# Patient Record
Sex: MALE | Race: WHITE | Employment: FULL TIME | URBAN - METROPOLITAN AREA
[De-identification: names, ages, dates, MRNs, and addresses within clinical notes are randomized per-mention and may not be internally consistent; named-entity substitution may affect disease eponyms.]

---

## 2017-11-07 ENCOUNTER — OFFICE VISIT (OUTPATIENT)
Dept: ENDOCRINOLOGY | Age: 57
End: 2017-11-07

## 2017-11-07 VITALS
WEIGHT: 196.5 LBS | DIASTOLIC BLOOD PRESSURE: 89 MMHG | SYSTOLIC BLOOD PRESSURE: 134 MMHG | BODY MASS INDEX: 26.04 KG/M2 | HEART RATE: 76 BPM | HEIGHT: 73 IN

## 2017-11-07 DIAGNOSIS — E03.9 ACQUIRED HYPOTHYROIDISM: ICD-10-CM

## 2017-11-07 DIAGNOSIS — E04.1 THYROID CYST: Primary | ICD-10-CM

## 2017-11-07 NOTE — MR AVS SNAPSHOT
Visit Information Date & Time Provider Department Dept. Phone Encounter #  
 11/7/2017  1:30 PM Monique Gorman, [address] [place] [address] Diabetes and Endocrinology [phone] [id number] Follow-up Instructions Return in about 6 months (around 4/30/2018). Upcoming Health Maintenance Date Due Hepatitis C Screening 1960 DTaP/Tdap/Td series (1 - Tdap) 2/14/1979 FOBT Q 1 YEAR AGE 50-75 2/14/2008 Influenza Age 5 to Adult 8/1/2017 Allergies as of 11/7/2017  Review Complete On: 11/7/2017 By: Monique Gorman MD  
  
 Severity Noted Reaction Type Reactions Sulfa (Sulfonamide Antibiotics)  11/07/2017    Rash Current Immunizations  Never Reviewed No immunizations on file. Not reviewed this visit You Were Diagnosed With   
  
 Codes Comments Thyroid cyst    -  Primary ICD-10-CM: Q60.3 ICD-9-CM: 246.2 Acquired hypothyroidism     ICD-10-CM: E03.9 ICD-9-CM: 842. 9 Vitals BP Pulse Height(growth percentile) Weight(growth percentile) BMI Smoking Status 134/89 (BP 1 Location: Left arm, BP Patient Position: Sitting) 76 6' 1\" (1.854 m) 196 lb 8 oz (89.1 kg) 25.93 kg/m2 Former Smoker BMI and BSA Data Body Mass Index Body Surface Area  
 25.93 kg/m 2 2.14 m 2 Preferred Pharmacy Pharmacy Name Phone [id number]. José Luis Hal, [facility] [phone] Your Updated Medication List  
  
Notice  As of 11/7/2017  2:14 PM  
 You have not been prescribed any medications. We Performed the Following   
 T3 TOTAL [id number] CPT(R)] T4, FREE [id number] CPT(R)] THYROID PEROXIDASE (TPO) AB [18383 CPT(R)] TSH 3RD GENERATION [63857 CPT(R)] Follow-up Instructions Return in about 6 months (around 4/30/2018). To-Do List   
 04/01/2018 Imaging:  US THYROID/PARATHYROID/SOFT TISS Introducing [facility]! Liam Alvarez introduces [employer] patient portal. Now you can access parts of your medical record, email your doctor's office, and request medication refills online. 1. In your internet browser, go to [email or web address] 2. Click on the First Time User? Click Here link in the Sign In box. You will see the New Member Sign Up page. 3. Enter your [employer] Access Code exactly as it appears below. You will not need to use this code after youve completed the sign-up process. If you do not sign up before the expiration date, you must request a new code. · [employer] Access Code: [email or web address] Expires: 2/5/2018  2:10 PM 
 
4. Enter the last four digits of your Social Security Number (xxxx) and Date of Birth (mm/dd/yyyy) as indicated and click Submit. You will be taken to the next sign-up page. 5. Create a [employer] ID. This will be your [employer] login ID and cannot be changed, so think of one that is secure and easy to remember. 6. Create a [employer] password. You can change your password at any time. 7. Enter your Password Reset Question and Answer. This can be used at a later time if you forget your password. 8. Enter your e-mail address. You will receive e-mail notification when new information is available in [address] 9. Click Sign Up. You can now view and download portions of your medical record. 10. Click the Download Summary menu link to download a portable copy of your medical information. If you have questions, please visit the Frequently Asked Questions section of the [employer] website. Remember, [employer] is NOT to be used for urgent needs. For medical emergencies, dial 911. Now available from your iPhone and Android! Please provide this summary of care documentation to your next provider. Your primary care clinician is listed as Betsy Slaughter. If you have any questions after today's visit, please call [phone].

## 2017-11-07 NOTE — PROGRESS NOTES
CONSULTATION REQUESTED BY: Venus Rob NP     REASON FOR CONSULT: Hypothyroidism and thyroid cyst    CHIEF COMPLAINT: Evaluation for hypothyroidism and thyroid cyst    HISTORY OF PRESENT ILLNESS:   Sofía Brar is a 59 y.o. male with a PMHx as noted below who was referred to our endocrinology clinic for evaluation of hypothyroidism and thyroid cyst    Patient had noted an area of swelling on the right anterior aspect of his neck. This started about a month ago, and was painless. He described it to his PCP who obtained an ultrasound. Ultrasound 10/23/17: Right lobe occupied by 5 cm mostly cystic lesion, no blood flow. Patient was seen by [facility] and planned to monitor. Labs: 10/23/17: TSH 6.29, FT4 1.24, TT3 80  Wife notes he has some fatigue and weight gain. Neck is non-tender  Patient is otherwise feeling well today, has not had other prior thyroid issues. Patient is presenting today requesting my evaluations and advice. PAST MEDICAL/SURGICAL HISTORY:   Thyroid cyst    ALLERGIES:   Allergies   Allergen Reactions    Sulfa (Sulfonamide Antibiotics) Rash       MEDICATIONS ON ADMISSION:   No current outpatient prescriptions on file. SOCIAL HISTORY:   Social History     Social History    Marital status:      Spouse name: N/A    Number of children: N/A    Years of education: N/A     Occupational History    Not on file.      Social History Main Topics    Smoking status: Former Smoker    Smokeless tobacco: Never Used    Alcohol use Yes    Drug use: No    Sexual activity: Not on file     Other Topics Concern    Not on file     Social History Narrative    No narrative on file       FAMILY HISTORY:  Family History   Problem Relation Age of Onset    Heart Disease Mother     Alcohol abuse Father     Arthritis-osteo Neg Hx     Asthma Neg Hx     Bleeding Prob Neg Hx     Cancer Neg Hx     Diabetes Neg Hx     Elevated Lipids Neg Hx     Gall Bladder Disease Neg Hx     Headache Neg Hx     Hypertension Neg Hx     Lung Disease Neg Hx     Mental Retardation Neg Hx     Migraines Neg Hx     Psychiatric Disorder Neg Hx     Stroke Neg Hx        REVIEW OF SYSTEMS: Complete ROS assessed and noted for that which is described above, all else are negative. Eyes: normal  ENT: normal  CVS: normal  Resp: normal  GI: normal  : normal  GYN: normal  Endocrine: normal  Integument: normal  Musculoskeletal: normal  Neuro: normal  Psych: normal      PHYSICAL EXAMINATION:    VITAL SIGNS:  Visit Vitals    /89 (BP 1 Location: Left arm, BP Patient Position: Sitting)    Pulse 76    Ht 6' 1\" (1.854 m)    Wt 196 lb 8 oz (89.1 kg)    BMI 25.93 kg/m2       GENERAL: NCAT, Sitting comfortably, NAD  EYES: EOMI, non-icteric, no proptosis  Ear/Nose/Throat: NCAT, no inflammation, no masses, thyroid gland is bulky on right  LYMPH NODES: No LAD  CARDIOVASCULAR: S1 S2, RRR, No murmur, 2+ radial pulses  RESPIRATORY: CTA b/l, no wheeze/rales  GASTROINTESTINAL: ND  MUSCULOSKELETAL: Normal ROM, no atrophy  SKIN: warm, no edema/rash/ or other skin changes  NEUROLOGIC: 5/5 power all extremities, no tremor, AAOx3  PSYCHIATRIC: Normal affect, Normal insight and judgement       REVIEW OF LABORATORY AND RADIOLOGY DATA:   Labs and documentation have been reviewed as described above. ASSESSMENT AND PLAN:   Eriberto Napier is a 59 y.o. male with a PMHx as noted above who was referred to our endocrinology clinic for evaluation of hypothyroidism and thyroid cyst.    Thyroid Cyst  Hypothyroidism    Thyroid Cyst: Today we reviewed the nature of cystic lesions vs solid lesions. We noted the low likelihood of malignancy in the cystic lesion (though not 0%), and the ability to monitor it as long as he remains asymptomatic. We discussed the option of FNA aspiration, though the possibility of re-accumulation. We discussed the possibility of surgery as well.  Patient decided to monitor and we will obtain a repeat thyroid ultrasound 6 months from the prior, to be performed outside 1 week prior to his visit around April 2018. He is advised to call if he has any concerns between now and then and if he changes his mind concerning interventions, that is welcomed also. Hypothyroidism: We discussed the various causes for hypothyroidism including chronic thyroiditis, subacute thyroiditis, and also the less likely possibility that it be related to his right lobe being predominantly cystic. We will repeat his labs today with TPO antibodies and advise him of any recovery, progression, and any next appropriate steps. 6 month f/u with ultrasound (outside [address] [facility] [address]    Kai JOHNSTON  [address] [facility] Endocr[facility]ology

## 2017-11-08 ENCOUNTER — TELEPHONE (OUTPATIENT)
Dept: ENDOCRINOLOGY | Age: 57
End: 2017-11-08

## 2017-11-08 LAB
T3 SERPL-MCNC: 111 NG/DL (ref 71–180)
T4 FREE SERPL-MCNC: 1.13 NG/DL (ref 0.82–1.77)
THYROPEROXIDASE AB SERPL-ACNC: 9 IU/ML (ref 0–34)
TSH SERPL DL<=0.005 MIU/L-ACNC: 6.35 UIU/ML (ref 0.45–4.5)

## 2017-11-08 NOTE — TELEPHONE ENCOUNTER
Emmanuel's wife Shakeel Brenner called me back and I relayed the message from Dr. Taz Enriquez. She understood the information and will pass it on to [place].   Renu Rizo

## 2017-11-08 NOTE — TELEPHONE ENCOUNTER
----- Message from German Lira MD sent at 11/8/2017  9:23 AM EST -----  TSH level is relatively unchanged at 6.35,   FT4 is 1.13 normal, and TT3 is 111. Thyroid antibody (TPO) is negative.    In this case, it is best to repeat his blood test in 2 months,  Since his TSH did not continue to rise, there is a chance it could come back down to normal,  Not required to come to the clinic in 2 months, can keep his current future appt,   However to appear at any [facility] in 2 months, let them know I have labs ordered in my name,   I will review the results with him at that time by phone,   If any treatment is needed at that time we will proceed as needed,

## 2017-11-08 NOTE — PROGRESS NOTES
TSH level is relatively unchanged at 6.35,   FT4 is 1.13 normal, and TT3 is 111. Thyroid antibody (TPO) is negative.    In this case, it is best to repeat his blood test in 2 months,  Since his TSH did not continue to rise, there is a chance it could come back down to normal,  Not required to come to the clinic in 2 months, can keep his current future appt,   However to appear at any [facility] in 2 months, let them know I have labs ordered in my name,   I will review the results with him at that time by phone,   If any treatment is needed at that time we will proceed as needed,

## 2017-11-08 NOTE — TELEPHONE ENCOUNTER
11/8/2017, 9:26 AM    Attempted to call Sofía Brar, reached voice mail. I left a message to return my call.       Garret Adhikari

## 2018-01-30 ENCOUNTER — TELEPHONE (OUTPATIENT)
Dept: ENDOCRINOLOGY | Age: 58
End: 2018-01-30

## 2018-01-30 DIAGNOSIS — E03.9 HYPOTHYROIDISM, UNSPECIFIED TYPE: Primary | ICD-10-CM

## 2018-01-30 LAB
T4 FREE SERPL-MCNC: 1.14 NG/DL (ref 0.82–1.77)
TSH SERPL DL<=0.005 MIU/L-ACNC: 4.39 UIU/ML (ref 0.45–4.5)

## 2018-01-30 NOTE — TELEPHONE ENCOUNTER
----- Message from Kirstin Maria MD sent at 1/30/2018  9:26 AM EST -----  Patients thyroid function has come back to normal. Will need to be monitored to assure it stays normal. I have ordered a repeat for him to obtain at the lab before his next visit in April. Yogesh Rutledge.  [facility]  [address] [place] [address]

## 2018-01-30 NOTE — PROGRESS NOTES
Patients thyroid function has come back to normal. Will need to be monitored to assure it stays normal. I have ordered a repeat for him to obtain at the lab before his next visit in April. Leonarda Carrillo.  [address] [facility]  [address] [place] [address]

## 2018-01-30 NOTE — TELEPHONE ENCOUNTER
1/30/2018, 9:58 AM    Attempted to call Micheal Mathias, reached voice mail. I left a message to return my call.       Cris Fuller

## 2018-01-30 NOTE — TELEPHONE ENCOUNTER
Mr. Black Gavecasandra wife Natividad Boykin returned my call. I relayed the message from Dr. Tracy Allen. She understood the information and will pass this on to [place].   Riaz Goddard

## 2018-03-01 ENCOUNTER — TELEPHONE (OUTPATIENT)
Dept: ENDOCRINOLOGY | Age: 58
End: 2018-03-01

## 2018-03-01 DIAGNOSIS — E04.1 THYROID NODULE: Primary | ICD-10-CM

## 2018-03-01 NOTE — TELEPHONE ENCOUNTER
Patient's wife is calling in regards to his\"thrid cysts. , she feels that they are getting larger. Please return her call for advice.     Patient contact:  [phone]

## 2018-03-02 NOTE — TELEPHONE ENCOUNTER
3/2/2018, 9:18 AM    Attempted to call Екатерина Bone, reached voice mail. I left a message to return my call.       Freedom Sosa

## 2018-03-02 NOTE — TELEPHONE ENCOUNTER
I called Ms. Kang back and relayed the message from Dr. Geovanna Leung. She understood the information and will do as instructed.   Bowen Boone

## 2018-03-02 NOTE — TELEPHONE ENCOUNTER
Thank you,   I sent a referral to the surgery clinic for evaluation,  They will likely get in touch with him, otherwise he can call their clinic at [phone] to discuss scheduling. Ashkan Hamilton.  [facility]  [address] [place] [address]

## 2018-03-02 NOTE — TELEPHONE ENCOUNTER
Alexi Siegel called me back. She said that Mr. Linda Ragsdale thyroid had gotten bigger and harder. It is pushing across the midline now and making swallowing hard. They would like to see a surgeon to talk about surgery. I said I would pass this on to Dr. Tommy Marquez and get back to her.  Her cell number is [phone]  Florina Pedersen

## 2018-03-12 ENCOUNTER — OFFICE VISIT (OUTPATIENT)
Dept: SURGERY | Age: 58
End: 2018-03-12

## 2018-03-12 VITALS
BODY MASS INDEX: 26.51 KG/M2 | HEART RATE: 73 BPM | DIASTOLIC BLOOD PRESSURE: 103 MMHG | SYSTOLIC BLOOD PRESSURE: 153 MMHG | OXYGEN SATURATION: 96 % | WEIGHT: 200 LBS | HEIGHT: 73 IN

## 2018-03-12 DIAGNOSIS — E04.1 SOLITARY NODULE OF RIGHT LOBE OF THYROID: Primary | ICD-10-CM

## 2018-03-12 NOTE — PROGRESS NOTES
Does not have advanced directive. 1. Have you been to the ER, urgent care clinic since your last visit? Hospitalized since your last visit? new patient  2. Have you seen or consulted any other health care providers outside of the [facility] since your last visit? Include any pap smears or colon screening.  No

## 2018-03-12 NOTE — PROGRESS NOTES
HISTORY OF PRESENT ILLNESS  Judy Merida is a 59 y.o. male who comes in for consultation by Dr Colonel Carrillo for a thyroid cyst  HPI  He developed right anterior neck swelling rather acutely in Oct 2017. He had a neck US demonstrated a 5 cm cyst on the right lobe of the thyroid. Thyroid function tests at that time noted a high TSH. It was not symptomatic initially and he initially elected observation. It has continued to enlarge and he is now having dysphagia. He denies voice changes, palpitations, or neck pressure/respiratory issues when laying flat. He denies family his of thyroid disease. Past Medical History:   Diagnosis Date    Cancer [facility]) 2006    skin     Past Surgical History:   Procedure Laterality Date    HX OTHER SURGICAL  1998    hydrocele repair      Family History   Problem Relation Age of Onset    Heart Disease Mother     Alcohol abuse Father     Arthritis-osteo Neg Hx     Asthma Neg Hx     Bleeding Prob Neg Hx     Cancer Neg Hx     Diabetes Neg Hx     Elevated Lipids Neg Hx     Gall Bladder Disease Neg Hx     Headache Neg Hx     Hypertension Neg Hx     Lung Disease Neg Hx     Mental Retardation Neg Hx     Migraines Neg Hx     Psychiatric Disorder Neg Hx     Stroke Neg Hx      Social History   Substance Use Topics    Smoking status: Former Smoker    Smokeless tobacco: Never Used    Alcohol use Yes     No current outpatient prescriptions on file. No current facility-administered medications for this visit. Allergies   Allergen Reactions    Sulfa (Sulfonamide Antibiotics) Rash       Review of Systems   Constitutional: Negative for chills, diaphoresis, fever, malaise/fatigue and weight loss. HENT: Negative for congestion, ear pain and sore throat. Eyes: Negative for blurred vision and pain. Respiratory: Negative for cough, hemoptysis, sputum production, shortness of breath, wheezing and stridor.     Cardiovascular: Negative for chest pain, palpitations, orthopnea, claudication, leg swelling and PND. Gastrointestinal: Negative for abdominal pain, blood in stool, constipation, diarrhea, heartburn, melena, nausea and vomiting. Genitourinary: Negative for dysuria, flank pain, frequency, hematuria and urgency. Musculoskeletal: Negative for back pain, joint pain, myalgias and neck pain. Skin: Negative for itching and rash. Neurological: Negative for dizziness, tremors, focal weakness, seizures, weakness and headaches. Endo/Heme/Allergies: Negative for polydipsia. Psychiatric/Behavioral: Negative for depression and memory loss. The patient is not nervous/anxious. Visit Vitals    BP (!) 153/103 (BP 1 Location: Right arm, BP Patient Position: Sitting)    Pulse 73    Ht 6' 1\" (1.854 m)    Wt 90.7 kg (200 lb)    SpO2 96%    BMI 26.39 kg/m2       Physical Exam   Constitutional: He is oriented to person, place, and time. He appears well-developed and well-nourished. No distress. HENT:   Head: Normocephalic and atraumatic. Mouth/Throat: Oropharynx is clear and moist. No oropharyngeal exudate. Eyes: Conjunctivae and EOM are normal. Pupils are equal, round, and reactive to light. No scleral icterus. Neck: Trachea normal, normal range of motion, full passive range of motion without pain and phonation normal. Neck supple. No tracheal deviation present. Thyroid mass (7 cm on right) present. No thyromegaly present. Cardiovascular: Normal rate, regular rhythm and normal heart sounds. Exam reveals no gallop and no friction rub. No murmur heard. Pulmonary/Chest: Effort normal and breath sounds normal. No stridor. No respiratory distress. He has no wheezes. He has no rales. Abdominal: Soft. Normal appearance and bowel sounds are normal. He exhibits no distension, no pulsatile liver and no mass. There is no hepatosplenomegaly. There is no tenderness. There is no rebound, no guarding and no CVA tenderness. No hernia.  Hernia confirmed negative in the right inguinal area and confirmed negative in the left inguinal area. Genitourinary: Testes normal. Cremasteric reflex is present. Circumcised. Musculoskeletal: Normal range of motion. He exhibits no edema or tenderness. Lymphadenopathy:     He has no cervical adenopathy. Right: No inguinal adenopathy present. Left: No inguinal adenopathy present. Neurological: He is alert and oriented to person, place, and time. No cranial nerve deficit. Coordination normal.   Skin: Skin is warm and dry. No rash noted. He is not diaphoretic. No erythema. Psychiatric: He has a normal mood and affect. His behavior is normal. Judgment and thought content normal.       ASSESSMENT and PLAN  1. Right thyroid cystic nodule. This is likely benign. I explained about the anatomy and pathophysiology of thyroid nodules/disease. I explained about possible malignancy. I discussed FNA, observation, possible thyroid suppression pending FNA, and total thyroidectomy. Risks of surgery include bleeding (potentially requiring emergent exploration at bedside), infection, parathyroid injury/removal requiring supplemental calcium +/- vit d, recurrent laryngeal/superior laryngeal nerve injury resulting in vocal cord paralysis, voice changes and fatigue, aspiration, further surgery, need for lifelong thyroid supplementation. This is likely a benign process. We discussed likelihood of fluid reaccumulation in the cyst with aspiration.     He wishes to proceed with a  Right thyroid lobectomy under general anesthesia as an outpatient with an overnight stay    Zulay Engle MD FACS

## 2018-03-12 NOTE — PATIENT INSTRUCTIONS
Surgery Instruction Sheet    You have been scheduled for surgery on 04/19/2018 at 7:30am at [facility] Please report to the Surgery Center at 5:45am, this is approximately 2 hours prior to your surgery time. The Surgery Center is located on the [facility] [address] side of the hospital, just next to the Emergency Room. Reserved parking is available and  parking if lot is full. You will need to have a Pre-op Visit prior to your surgery. Report to the Surgery Center on 04/11/2018 at 8:00am.  Bring a list of medications and your insurance cards with you. You may eat/drink prior to this visit. Call your physician immediately if you notice a change in your health between the time you saw your physician and the day of surgery. If you take a blood thinner, please let us know. Call your ordering Doctor to make sure you can stop taking it prior to your surgery. STOP YOUR ASPIRIN 10 DAYS PRIOR TO SURGERY. DO NOT TAKE  IBUPROFEN, ADVIL, MOTRIN, ALEVE, EXCEDRIN, BC POWDER, GOODIES, FISH OIL OR ANY MEDICATION CONTAINING ASPIRIN 10 DAYS PRIOR TO YOUR SURGERY. MAY TAKE TYLENOL. Eat a light dinner the evening before your surgery. DO NOT EAT OR DRINK ANYTHING AFTER MIDNIGHT THE NIGHT BEFORE YOUR SURGERY. This includes water, chewing gum, lifesavers, etc.  The Pre op nurse will check with you about any medication that you may need to take the morning of surgery. Shower with a new bar of anti-bacterial soap (Dial, Safeguard) or solution given to you by Pre-op, the night before surgery. Do not use lotion, powder, deodorant on the skin after showering. Wear loose, comfortable clothing the day of surgery and bring a container to store your contacts, eyeglasses, dentures, hearing aid, etc.  Do not bring money, valuables, jewelry, etc. to the hospital.      If you are having outpatient surgery, someone must come with you the morning of surgery to drive you home.   You can not drive for 24 hours after any anesthesia. Sometimes it is necessary to stay overnight and leave the next morning. This is still considered outpatient for most insurance deductibles. Someone will still need to drive you home. If you have questions or concerns, please feel free to call Dr Raymundo Mckay at [phone]. If you need to cancel your surgery, please call as soon as possible.

## 2018-03-13 PROBLEM — E04.1 SOLITARY NODULE OF RIGHT LOBE OF THYROID: Status: ACTIVE | Noted: 2018-03-13

## 2018-04-12 ENCOUNTER — HOSPITAL ENCOUNTER (OUTPATIENT)
Dept: PREADMISSION TESTING | Age: 58
Discharge: HOME | End: 2018-04-12
Attending: SURGERY
Payer: COMMERCIAL

## 2018-04-12 VITALS
RESPIRATION RATE: 18 BRPM | DIASTOLIC BLOOD PRESSURE: 90 MMHG | WEIGHT: 194.67 LBS | HEART RATE: 74 BPM | TEMPERATURE: 98 F | OXYGEN SATURATION: 98 % | BODY MASS INDEX: 25.8 KG/M2 | SYSTOLIC BLOOD PRESSURE: 140 MMHG | HEIGHT: 73 IN

## 2018-04-12 LAB
ANION GAP SERPL CALC-SCNC: 3 MMOL/L (ref 5–15)
APPEARANCE UR: CLEAR
ATRIAL RATE: 76 BPM
BACTERIA URNS QL MICRO: NEGATIVE /HPF
BILIRUB UR QL: NEGATIVE
BUN SERPL-MCNC: 15 MG/DL (ref 6–20)
BUN/CREAT SERPL: 15 (ref 12–20)
CALCIUM SERPL-MCNC: 9.4 MG/DL (ref 8.5–10.1)
CALCULATED P AXIS, ECG09: 39 DEGREES
CALCULATED R AXIS, ECG10: 49 DEGREES
CALCULATED T AXIS, ECG11: 42 DEGREES
CHLORIDE SERPL-SCNC: 109 MMOL/L (ref 97–108)
CO2 SERPL-SCNC: 27 MMOL/L (ref 21–32)
COLOR UR: NORMAL
CREAT SERPL-MCNC: 1.03 MG/DL (ref 0.7–1.3)
DIAGNOSIS, 93000: NORMAL
EPITH CASTS URNS QL MICRO: NORMAL /LPF
ERYTHROCYTE [DISTWIDTH] IN BLOOD BY AUTOMATED COUNT: 12.7 % (ref 11.5–14.5)
GLUCOSE SERPL-MCNC: 102 MG/DL (ref 65–100)
GLUCOSE UR STRIP.AUTO-MCNC: NEGATIVE MG/DL
HCT VFR BLD AUTO: 43.4 % (ref 36.6–50.3)
HGB BLD-MCNC: 15.1 G/DL (ref 12.1–17)
HGB UR QL STRIP: NEGATIVE
HYALINE CASTS URNS QL MICRO: NORMAL /LPF (ref 0–5)
KETONES UR QL STRIP.AUTO: NEGATIVE MG/DL
LEUKOCYTE ESTERASE UR QL STRIP.AUTO: NEGATIVE
MCH RBC QN AUTO: 29.7 PG (ref 26–34)
MCHC RBC AUTO-ENTMCNC: 34.8 G/DL (ref 30–36.5)
MCV RBC AUTO: 85.4 FL (ref 80–99)
NITRITE UR QL STRIP.AUTO: NEGATIVE
NRBC # BLD: 0 K/UL (ref 0–0.01)
NRBC BLD-RTO: 0 PER 100 WBC
P-R INTERVAL, ECG05: 166 MS
PH UR STRIP: 8 [PH] (ref 5–8)
PLATELET # BLD AUTO: 224 K/UL (ref 150–400)
PMV BLD AUTO: 9.1 FL (ref 8.9–12.9)
POTASSIUM SERPL-SCNC: 4.3 MMOL/L (ref 3.5–5.1)
PROT UR STRIP-MCNC: NEGATIVE MG/DL
Q-T INTERVAL, ECG07: 378 MS
QRS DURATION, ECG06: 94 MS
QTC CALCULATION (BEZET), ECG08: 425 MS
RBC # BLD AUTO: 5.08 M/UL (ref 4.1–5.7)
RBC #/AREA URNS HPF: NORMAL /HPF (ref 0–5)
SODIUM SERPL-SCNC: 139 MMOL/L (ref 136–145)
SP GR UR REFRACTOMETRY: 1.01 (ref 1–1.03)
UA: UC IF INDICATED,UAUC: NORMAL
UROBILINOGEN UR QL STRIP.AUTO: 0.2 EU/DL (ref 0.2–1)
VENTRICULAR RATE, ECG03: 76 BPM
WBC # BLD AUTO: 5.1 K/UL (ref 4.1–11.1)
WBC URNS QL MICRO: NORMAL /HPF (ref 0–4)

## 2018-04-12 PROCEDURE — 80048 BASIC METABOLIC PNL TOTAL CA: CPT | Performed by: SURGERY

## 2018-04-12 PROCEDURE — 93005 ELECTROCARDIOGRAM TRACING: CPT

## 2018-04-12 PROCEDURE — 85027 COMPLETE CBC AUTOMATED: CPT | Performed by: SURGERY

## 2018-04-12 PROCEDURE — 81001 URINALYSIS AUTO W/SCOPE: CPT | Performed by: SURGERY

## 2018-04-12 PROCEDURE — 36415 COLL VENOUS BLD VENIPUNCTURE: CPT | Performed by: SURGERY

## 2018-04-12 RX ORDER — CEFAZOLIN SODIUM 1 G/3ML
2 INJECTION, POWDER, FOR SOLUTION INTRAMUSCULAR; INTRAVENOUS ONCE
Status: CANCELLED | OUTPATIENT
Start: 2018-04-19 | End: 2018-04-19

## 2018-04-12 RX ORDER — AMLODIPINE BESYLATE 5 MG/1
5 TABLET ORAL DAILY
COMMUNITY

## 2018-04-12 RX ORDER — SODIUM CHLORIDE, SODIUM LACTATE, POTASSIUM CHLORIDE, CALCIUM CHLORIDE 600; 310; 30; 20 MG/100ML; MG/100ML; MG/100ML; MG/100ML
25 INJECTION, SOLUTION INTRAVENOUS CONTINUOUS
Status: CANCELLED | OUTPATIENT
Start: 2018-04-19

## 2018-04-12 NOTE — PERIOP NOTES
[facility]  Preoperative Instructions        Surgery Date 04/19/18         Time of Arrival 545am    1. On the day of your surgery, please report to the Surgical Services Registration Desk and sign in at your designated time. The Surgery Center is located to the right of the Emergency Room. 2. You must have someone with you to drive you home. You should not drive a car for 24 hours following surgery. Please make arrangements for a friend or family member to stay with you for the first 24 hours after your surgery. 3. Do not have anything to eat or drink (including water, gum, mints, coffee, juice) after midnight 04/18/18?? Jessi Kumar ? This may not apply to medications prescribed by your physician. ?(Please note below the special instructions with medications to take the morning of your procedure.)    4. We recommend you do not drink any alcoholic beverages for 24 hours before and after your surgery. 5. Contact your surgeons office for instructions on the following medications: non-steroidal anti-inflammatory drugs (i.e. Advil, Aleve), vitamins, and supplements. (Some surgeons will want you to stop these medications prior to surgery and others may allow you to take them)  **If you are currently taking Plavix, Coumadin, Aspirin and/or other blood-thinning agents, contact your surgeon for instructions. ** Your surgeon will partner with the physician prescribing these medications to determine if it is safe to stop or if you need to continue taking. Please do not stop taking these medications without instructions from your surgeon    6. Wear comfortable clothes. Wear glasses instead of contacts. Do not bring any money or jewelry. Please bring picture ID, insurance card, and any prearranged co-payment or hospital payment. Do not wear make-up, particularly mascara the morning of your surgery. Do not wear nail polish, particularly if you are having foot /hand surgery.   Wear your hair loose or down, no ponytails, buns, nicholas pins or clips. All body piercings must be removed. Please shower with antibacterial soap for three consecutive days before and on the morning of surgery, but do not apply any lotions, powders or deodorants after the shower on the day of surgery. Please use a fresh towels after each shower. Please sleep in clean clothes and change bed linens the night before surgery. Please do not shave for 48 hours prior to surgery. Shaving of the face is acceptable. 7. You should understand that if you do not follow these instructions your surgery may be cancelled. If your physical condition changes (I.e. fever, cold or flu) please contact your surgeon as soon as possible. 8. It is important that you be on time. If a situation occurs where you may be late, please call [phone] (OR Holding Area). 9. If you have any questions and or problems, please call [phone] (Pre-admission Testing). 10. Your surgery time may be subject to change. You will receive a phone call the evening prior if your time changes. 11.  If having outpatient surgery, you must have someone to drive you here, stay with you during the duration of your stay, and to drive you home at time of discharge. 12.   In an effort to improve the efficiency, privacy, and safety for all of our Pre-op patients visitors are not allowed in the Holding area. Once you arrive and are registered your family/visitors will be asked to remain in the waiting room. The Pre-op staff will get you from the Surgical Waiting Area and will explain to you and your family/visitors that the Pre-op phase is beginning. The staff will answer any questions and provide instructions for tracking of the patient, by use of the existing tracking number and color-coded status board in the waiting room.   At this time the staff will also ask for your designated spokesperson information in the event that the physician or staff need to provide an update or obtain any pertinent information. The designated spokesperson will be notified if the physician needs to speak to family during the pre-operative phase. If at any time your family/visitors has questions or concerns they may approach the volunteer desk in the waiting area for assistance. Special Instructions:    MEDICATIONS TO TAKE THE MORNING OF SURGERY WITH A SIP OF WATER:amlodipine      I understand a pre-operative phone call will be made to verify my surgery time. In the event that I am not available, I give permission for a message to be left on my answering service and/or with another person? {yes [phone]         ___________________      __________   _________    (Signature of Patient)             (Witness)                (Date and Time)Incentive Spirometer        Using the incentive spirometer helps expand the small air sacs of your lungs, helps you breathe deeply, and helps improve your lung function. Use your incentive spirometer twice a day (10 breaths each time) prior to surgery. How to Use Your Incentive Spirometer:  1. Hold the incentive spirometer in an upright position. 2. Breathe out as usual.   3. Place the mouthpiece in your mouth and seal your lips tightly around it. 4. Take a deep breath. Breathe in slowly and as deeply as possible. Keep the blue flow rate guide between the arrows. 5. Hold your breath as long as possible. Then exhale slowly and allow the piston to fall to the bottom of the column. 6. Rest for a few seconds and repeat steps one through five at least 10 times. PAT Tidal Volume_2500_________________  x_____1__________  Date____04/12/18___________________    Derrick Funes THE INCENTIVE SPIROMETER WITH YOU TO THE HOSPITAL ON THE DAY OF YOUR SURGERY. Opportunity given to ask and answer questions as well as to observe return demonstration.     Patient signature_____________________________    Witness___Chana Sellers RN_________________________

## 2018-04-12 NOTE — ADVANCED PRACTICE NURSE
MARCELO score of 4 in PAT assessment. Pt admits to snoring, but denies ever having been advised that he has pauses in breathing while sleeping or ever having been recommended for a sleep apnea evaluation. Denies prior complications from anesthesia. Denies decreased cervical ROM. Denies loose teeth, dentures or partial plates.

## 2018-04-19 ENCOUNTER — HOSPITAL ENCOUNTER (OUTPATIENT)
Age: 58
Discharge: HOME | End: 2018-04-19
Attending: SURGERY | Admitting: SURGERY
Payer: COMMERCIAL

## 2018-04-19 ENCOUNTER — ANESTHESIA EVENT (OUTPATIENT)
Dept: SURGERY | Age: 58
End: 2018-04-19
Payer: COMMERCIAL

## 2018-04-19 ENCOUNTER — ANESTHESIA (OUTPATIENT)
Dept: SURGERY | Age: 58
End: 2018-04-19
Payer: COMMERCIAL

## 2018-04-19 VITALS
HEART RATE: 88 BPM | SYSTOLIC BLOOD PRESSURE: 120 MMHG | BODY MASS INDEX: 25.8 KG/M2 | TEMPERATURE: 97.9 F | RESPIRATION RATE: 12 BRPM | WEIGHT: 194.67 LBS | OXYGEN SATURATION: 100 % | HEIGHT: 73 IN | DIASTOLIC BLOOD PRESSURE: 81 MMHG

## 2018-04-19 DIAGNOSIS — E04.1 RIGHT THYROID NODULE: Primary | ICD-10-CM

## 2018-04-19 LAB — CALCIUM SERPL-MCNC: 8.9 MG/DL (ref 8.5–10.1)

## 2018-04-19 PROCEDURE — 77030018390 HC SPNG HEMSTAT2 J&J -B: Performed by: SURGERY

## 2018-04-19 PROCEDURE — 74011250636 HC RX REV CODE- 250/636: Performed by: SURGERY

## 2018-04-19 PROCEDURE — 74011250637 HC RX REV CODE- 250/637: Performed by: SURGERY

## 2018-04-19 PROCEDURE — 77030010516 HC APPL HEMA CLP TELE -B: Performed by: SURGERY

## 2018-04-19 PROCEDURE — 74011000250 HC RX REV CODE- 250: Performed by: SURGERY

## 2018-04-19 PROCEDURE — 76210000016 HC OR PH I REC 1 TO 1.5 HR: Performed by: SURGERY

## 2018-04-19 PROCEDURE — 77030008684 HC TU ET CUF COVD -B: Performed by: NURSE ANESTHETIST, CERTIFIED REGISTERED

## 2018-04-19 PROCEDURE — 77030018836 HC SOL IRR NACL ICUM -A: Performed by: SURGERY

## 2018-04-19 PROCEDURE — 36415 COLL VENOUS BLD VENIPUNCTURE: CPT | Performed by: SURGERY

## 2018-04-19 PROCEDURE — 77030026438 HC STYL ET INTUB CARD -A: Performed by: NURSE ANESTHETIST, CERTIFIED REGISTERED

## 2018-04-19 PROCEDURE — 74011250636 HC RX REV CODE- 250/636: Performed by: ANESTHESIOLOGY

## 2018-04-19 PROCEDURE — 82310 ASSAY OF CALCIUM: CPT | Performed by: SURGERY

## 2018-04-19 PROCEDURE — 77030012406 HC DRN WND PENRS BARD -A: Performed by: SURGERY

## 2018-04-19 PROCEDURE — 77030002996 HC SUT SLK J&J -A: Performed by: SURGERY

## 2018-04-19 PROCEDURE — 74011250636 HC RX REV CODE- 250/636

## 2018-04-19 PROCEDURE — 77030010938 HC CLP LIG TELE -A: Performed by: SURGERY

## 2018-04-19 PROCEDURE — 77030011267 HC ELECTRD BLD COVD -A: Performed by: SURGERY

## 2018-04-19 PROCEDURE — 88307 TISSUE EXAM BY PATHOLOGIST: CPT | Performed by: SURGERY

## 2018-04-19 PROCEDURE — 76060000034 HC ANESTHESIA 1.5 TO 2 HR: Performed by: SURGERY

## 2018-04-19 PROCEDURE — 76010000153 HC OR TIME 1.5 TO 2 HR: Performed by: SURGERY

## 2018-04-19 PROCEDURE — 77030019908 HC STETH ESOPH SIMS -A: Performed by: NURSE ANESTHETIST, CERTIFIED REGISTERED

## 2018-04-19 PROCEDURE — 77030011640 HC PAD GRND REM COVD -A: Performed by: SURGERY

## 2018-04-19 PROCEDURE — 77030039266 HC ADH SKN EXOFIN S2SG -A: Performed by: SURGERY

## 2018-04-19 PROCEDURE — 74011000250 HC RX REV CODE- 250

## 2018-04-19 PROCEDURE — 77030031139 HC SUT VCRL2 J&J -A: Performed by: SURGERY

## 2018-04-19 PROCEDURE — 77030020782 HC GWN BAIR PAWS FLX 3M -B

## 2018-04-19 PROCEDURE — 77030032490 HC SLV COMPR SCD KNE COVD -B: Performed by: SURGERY

## 2018-04-19 RX ORDER — MORPHINE SULFATE 2 MG/ML
1-2 INJECTION, SOLUTION INTRAMUSCULAR; INTRAVENOUS
Status: DISCONTINUED | OUTPATIENT
Start: 2018-04-19 | End: 2018-04-19

## 2018-04-19 RX ORDER — SODIUM CHLORIDE 0.9 % (FLUSH) 0.9 %
5-10 SYRINGE (ML) INJECTION EVERY 8 HOURS
Status: DISCONTINUED | OUTPATIENT
Start: 2018-04-19 | End: 2018-04-19 | Stop reason: HOSPADM

## 2018-04-19 RX ORDER — OXYCODONE AND ACETAMINOPHEN 5; 325 MG/1; MG/1
1-2 TABLET ORAL
Qty: 30 TAB | Refills: 0 | Status: SHIPPED | OUTPATIENT
Start: 2018-04-19 | End: 2018-05-09

## 2018-04-19 RX ORDER — LIDOCAINE HYDROCHLORIDE 10 MG/ML
0.1 INJECTION, SOLUTION EPIDURAL; INFILTRATION; INTRACAUDAL; PERINEURAL AS NEEDED
Status: DISCONTINUED | OUTPATIENT
Start: 2018-04-19 | End: 2018-04-19 | Stop reason: HOSPADM

## 2018-04-19 RX ORDER — OXYCODONE AND ACETAMINOPHEN 5; 325 MG/1; MG/1
1 TABLET ORAL
Status: DISCONTINUED | OUTPATIENT
Start: 2018-04-19 | End: 2018-04-19 | Stop reason: HOSPADM

## 2018-04-19 RX ORDER — MORPHINE SULFATE 10 MG/ML
1-2 INJECTION, SOLUTION INTRAMUSCULAR; INTRAVENOUS
Status: DISCONTINUED | OUTPATIENT
Start: 2018-04-19 | End: 2018-04-19 | Stop reason: HOSPADM

## 2018-04-19 RX ORDER — GLYCOPYRROLATE 0.2 MG/ML
INJECTION INTRAMUSCULAR; INTRAVENOUS AS NEEDED
Status: DISCONTINUED | OUTPATIENT
Start: 2018-04-19 | End: 2018-04-19 | Stop reason: HOSPADM

## 2018-04-19 RX ORDER — BUPIVACAINE HYDROCHLORIDE AND EPINEPHRINE 5; 5 MG/ML; UG/ML
INJECTION, SOLUTION EPIDURAL; INTRACAUDAL; PERINEURAL AS NEEDED
Status: DISCONTINUED | OUTPATIENT
Start: 2018-04-19 | End: 2018-04-19 | Stop reason: HOSPADM

## 2018-04-19 RX ORDER — FENTANYL CITRATE 50 UG/ML
INJECTION, SOLUTION INTRAMUSCULAR; INTRAVENOUS AS NEEDED
Status: DISCONTINUED | OUTPATIENT
Start: 2018-04-19 | End: 2018-04-19 | Stop reason: HOSPADM

## 2018-04-19 RX ORDER — PHENYLEPHRINE HCL IN 0.9% NACL 0.4MG/10ML
SYRINGE (ML) INTRAVENOUS AS NEEDED
Status: DISCONTINUED | OUTPATIENT
Start: 2018-04-19 | End: 2018-04-19 | Stop reason: HOSPADM

## 2018-04-19 RX ORDER — DEXTROSE, SODIUM CHLORIDE, AND POTASSIUM CHLORIDE 5; .45; .15 G/100ML; G/100ML; G/100ML
50 INJECTION INTRAVENOUS CONTINUOUS
Status: DISCONTINUED | OUTPATIENT
Start: 2018-04-19 | End: 2018-04-19 | Stop reason: HOSPADM

## 2018-04-19 RX ORDER — FENTANYL CITRATE 50 UG/ML
50 INJECTION, SOLUTION INTRAMUSCULAR; INTRAVENOUS AS NEEDED
Status: DISCONTINUED | OUTPATIENT
Start: 2018-04-19 | End: 2018-04-19 | Stop reason: HOSPADM

## 2018-04-19 RX ORDER — MIDAZOLAM HYDROCHLORIDE 1 MG/ML
1 INJECTION, SOLUTION INTRAMUSCULAR; INTRAVENOUS AS NEEDED
Status: DISCONTINUED | OUTPATIENT
Start: 2018-04-19 | End: 2018-04-19 | Stop reason: HOSPADM

## 2018-04-19 RX ORDER — SODIUM CHLORIDE 0.9 % (FLUSH) 0.9 %
5-10 SYRINGE (ML) INJECTION AS NEEDED
Status: DISCONTINUED | OUTPATIENT
Start: 2018-04-19 | End: 2018-04-19 | Stop reason: HOSPADM

## 2018-04-19 RX ORDER — ONDANSETRON 2 MG/ML
4 INJECTION INTRAMUSCULAR; INTRAVENOUS AS NEEDED
Status: DISCONTINUED | OUTPATIENT
Start: 2018-04-19 | End: 2018-04-19 | Stop reason: HOSPADM

## 2018-04-19 RX ORDER — SODIUM CHLORIDE, SODIUM LACTATE, POTASSIUM CHLORIDE, CALCIUM CHLORIDE 600; 310; 30; 20 MG/100ML; MG/100ML; MG/100ML; MG/100ML
25 INJECTION, SOLUTION INTRAVENOUS CONTINUOUS
Status: DISCONTINUED | OUTPATIENT
Start: 2018-04-19 | End: 2018-04-19 | Stop reason: HOSPADM

## 2018-04-19 RX ORDER — ONDANSETRON 4 MG/1
4 TABLET, ORALLY DISINTEGRATING ORAL
Status: DISCONTINUED | OUTPATIENT
Start: 2018-04-19 | End: 2018-04-19 | Stop reason: HOSPADM

## 2018-04-19 RX ORDER — PROPOFOL 10 MG/ML
INJECTION, EMULSION INTRAVENOUS AS NEEDED
Status: DISCONTINUED | OUTPATIENT
Start: 2018-04-19 | End: 2018-04-19 | Stop reason: HOSPADM

## 2018-04-19 RX ORDER — ROCURONIUM BROMIDE 10 MG/ML
INJECTION, SOLUTION INTRAVENOUS AS NEEDED
Status: DISCONTINUED | OUTPATIENT
Start: 2018-04-19 | End: 2018-04-19 | Stop reason: HOSPADM

## 2018-04-19 RX ORDER — NALOXONE HYDROCHLORIDE 0.4 MG/ML
0.2 INJECTION, SOLUTION INTRAMUSCULAR; INTRAVENOUS; SUBCUTANEOUS
Status: DISCONTINUED | OUTPATIENT
Start: 2018-04-19 | End: 2018-04-19 | Stop reason: HOSPADM

## 2018-04-19 RX ORDER — FENTANYL CITRATE 50 UG/ML
25 INJECTION, SOLUTION INTRAMUSCULAR; INTRAVENOUS
Status: DISCONTINUED | OUTPATIENT
Start: 2018-04-19 | End: 2018-04-19 | Stop reason: HOSPADM

## 2018-04-19 RX ORDER — MORPHINE SULFATE 10 MG/ML
2 INJECTION, SOLUTION INTRAMUSCULAR; INTRAVENOUS
Status: DISCONTINUED | OUTPATIENT
Start: 2018-04-19 | End: 2018-04-19 | Stop reason: HOSPADM

## 2018-04-19 RX ORDER — AMLODIPINE BESYLATE 5 MG/1
5 TABLET ORAL DAILY
Status: DISCONTINUED | OUTPATIENT
Start: 2018-04-19 | End: 2018-04-19 | Stop reason: HOSPADM

## 2018-04-19 RX ORDER — SUCCINYLCHOLINE CHLORIDE 20 MG/ML
INJECTION INTRAMUSCULAR; INTRAVENOUS AS NEEDED
Status: DISCONTINUED | OUTPATIENT
Start: 2018-04-19 | End: 2018-04-19 | Stop reason: HOSPADM

## 2018-04-19 RX ORDER — CEFAZOLIN SODIUM 1 G/3ML
2 INJECTION, POWDER, FOR SOLUTION INTRAMUSCULAR; INTRAVENOUS ONCE
Status: COMPLETED | OUTPATIENT
Start: 2018-04-19 | End: 2018-04-19

## 2018-04-19 RX ORDER — NEOSTIGMINE METHYLSULFATE 1 MG/ML
INJECTION INTRAVENOUS AS NEEDED
Status: DISCONTINUED | OUTPATIENT
Start: 2018-04-19 | End: 2018-04-19 | Stop reason: HOSPADM

## 2018-04-19 RX ORDER — LIDOCAINE HYDROCHLORIDE 20 MG/ML
INJECTION, SOLUTION EPIDURAL; INFILTRATION; INTRACAUDAL; PERINEURAL AS NEEDED
Status: DISCONTINUED | OUTPATIENT
Start: 2018-04-19 | End: 2018-04-19 | Stop reason: HOSPADM

## 2018-04-19 RX ORDER — DIPHENHYDRAMINE HYDROCHLORIDE 50 MG/ML
12.5 INJECTION, SOLUTION INTRAMUSCULAR; INTRAVENOUS AS NEEDED
Status: DISCONTINUED | OUTPATIENT
Start: 2018-04-19 | End: 2018-04-19 | Stop reason: HOSPADM

## 2018-04-19 RX ORDER — LEVOTHYROXINE SODIUM 125 UG/1
125 TABLET ORAL DAILY
Status: DISCONTINUED | OUTPATIENT
Start: 2018-04-19 | End: 2018-04-19

## 2018-04-19 RX ORDER — MIDAZOLAM HYDROCHLORIDE 1 MG/ML
INJECTION, SOLUTION INTRAMUSCULAR; INTRAVENOUS AS NEEDED
Status: DISCONTINUED | OUTPATIENT
Start: 2018-04-19 | End: 2018-04-19 | Stop reason: HOSPADM

## 2018-04-19 RX ORDER — HYDROMORPHONE HYDROCHLORIDE 2 MG/ML
INJECTION, SOLUTION INTRAMUSCULAR; INTRAVENOUS; SUBCUTANEOUS AS NEEDED
Status: DISCONTINUED | OUTPATIENT
Start: 2018-04-19 | End: 2018-04-19 | Stop reason: HOSPADM

## 2018-04-19 RX ADMIN — Medication 120 MCG: at 07:46

## 2018-04-19 RX ADMIN — MIDAZOLAM HYDROCHLORIDE 2 MG: 1 INJECTION, SOLUTION INTRAMUSCULAR; INTRAVENOUS at 07:18

## 2018-04-19 RX ADMIN — LIDOCAINE HYDROCHLORIDE 100 MG: 20 INJECTION, SOLUTION EPIDURAL; INFILTRATION; INTRACAUDAL; PERINEURAL at 07:21

## 2018-04-19 RX ADMIN — PROPOFOL 200 MG: 10 INJECTION, EMULSION INTRAVENOUS at 07:21

## 2018-04-19 RX ADMIN — FENTANYL CITRATE 25 MCG: 50 INJECTION, SOLUTION INTRAMUSCULAR; INTRAVENOUS at 10:35

## 2018-04-19 RX ADMIN — HYDROMORPHONE HYDROCHLORIDE 0.6 MG: 2 INJECTION, SOLUTION INTRAMUSCULAR; INTRAVENOUS; SUBCUTANEOUS at 08:55

## 2018-04-19 RX ADMIN — FENTANYL CITRATE 25 MCG: 50 INJECTION, SOLUTION INTRAMUSCULAR; INTRAVENOUS at 10:40

## 2018-04-19 RX ADMIN — GLYCOPYRROLATE 0.6 MG: 0.2 INJECTION INTRAMUSCULAR; INTRAVENOUS at 08:43

## 2018-04-19 RX ADMIN — Medication 120 MCG: at 08:30

## 2018-04-19 RX ADMIN — CEFAZOLIN 2 G: 1 INJECTION, POWDER, FOR SOLUTION INTRAMUSCULAR; INTRAVENOUS; PARENTERAL at 07:25

## 2018-04-19 RX ADMIN — Medication 80 MCG: at 07:39

## 2018-04-19 RX ADMIN — ROCURONIUM BROMIDE 15 MG: 10 INJECTION, SOLUTION INTRAVENOUS at 07:29

## 2018-04-19 RX ADMIN — FENTANYL CITRATE 100 MCG: 50 INJECTION, SOLUTION INTRAMUSCULAR; INTRAVENOUS at 07:21

## 2018-04-19 RX ADMIN — ONDANSETRON 4 MG: 4 TABLET, ORALLY DISINTEGRATING ORAL at 12:54

## 2018-04-19 RX ADMIN — ROCURONIUM BROMIDE 5 MG: 10 INJECTION, SOLUTION INTRAVENOUS at 07:21

## 2018-04-19 RX ADMIN — NEOSTIGMINE METHYLSULFATE 3 MG: 1 INJECTION INTRAVENOUS at 08:43

## 2018-04-19 RX ADMIN — ONDANSETRON 4 MG: 2 INJECTION INTRAMUSCULAR; INTRAVENOUS at 10:30

## 2018-04-19 RX ADMIN — FENTANYL CITRATE 50 MCG: 50 INJECTION, SOLUTION INTRAMUSCULAR; INTRAVENOUS at 07:39

## 2018-04-19 RX ADMIN — SUCCINYLCHOLINE CHLORIDE 180 MG: 20 INJECTION INTRAMUSCULAR; INTRAVENOUS at 07:21

## 2018-04-19 RX ADMIN — DEXTROSE MONOHYDRATE, SODIUM CHLORIDE, AND POTASSIUM CHLORIDE 50 ML/HR: 50; 4.5; 1.49 INJECTION, SOLUTION INTRAVENOUS at 09:30

## 2018-04-19 RX ADMIN — ROCURONIUM BROMIDE 10 MG: 10 INJECTION, SOLUTION INTRAVENOUS at 07:37

## 2018-04-19 RX ADMIN — SODIUM CHLORIDE, SODIUM LACTATE, POTASSIUM CHLORIDE, AND CALCIUM CHLORIDE: 600; 310; 30; 20 INJECTION, SOLUTION INTRAVENOUS at 08:15

## 2018-04-19 RX ADMIN — Medication 120 MCG: at 08:20

## 2018-04-19 RX ADMIN — FENTANYL CITRATE 50 MCG: 50 INJECTION, SOLUTION INTRAMUSCULAR; INTRAVENOUS at 08:21

## 2018-04-19 RX ADMIN — Medication 120 MCG: at 08:09

## 2018-04-19 RX ADMIN — PROPOFOL 20 MG: 10 INJECTION, EMULSION INTRAVENOUS at 07:30

## 2018-04-19 RX ADMIN — SODIUM CHLORIDE, SODIUM LACTATE, POTASSIUM CHLORIDE, AND CALCIUM CHLORIDE 25 ML/HR: 600; 310; 30; 20 INJECTION, SOLUTION INTRAVENOUS at 06:17

## 2018-04-19 RX ADMIN — Medication 160 MCG: at 08:35

## 2018-04-19 RX ADMIN — ROCURONIUM BROMIDE 10 MG: 10 INJECTION, SOLUTION INTRAVENOUS at 07:46

## 2018-04-19 RX ADMIN — OXYCODONE HYDROCHLORIDE AND ACETAMINOPHEN 1 TABLET: 5; 325 TABLET ORAL at 16:14

## 2018-04-19 RX ADMIN — NEOSTIGMINE METHYLSULFATE 2 MG: 1 INJECTION INTRAVENOUS at 08:45

## 2018-04-19 NOTE — ANESTHESIA POSTPROCEDURE EVALUATION
Post-Anesthesia Evaluation and Assessment    Patient: Jenn Hunter MRN: [id number]  SSN: [id number]    YOB: 1960  Age: 59 y.o. Sex: male       Cardiovascular Function/Vital Signs  Visit Vitals    /76 (BP 1 Location: Right arm, BP Patient Position: At rest)    Pulse 73    Temp 36.7 °C (98.1 °F)    Resp 10    Ht 6' 1\" (1.854 m)    Wt 88.3 kg (194 lb 10.7 oz)    SpO2 96%    BMI 25.68 kg/m2       Patient is status post general anesthesia for Procedure(s):  RIGHT THYROID LOBECTOMY. Nausea/Vomiting: None    Postoperative hydration reviewed and adequate. Pain:  Pain Scale 1: FLACC (04/19/18 1045)  Pain Intensity 1: 0 (04/19/18 1045)   Managed    Neurological Status:   Neuro (WDL): Exceptions to WDL (04/19/18 0900)  Neuro  Neurologic State: Drowsy (04/19/18 0900)  Orientation Level: Oriented X4 (04/19/18 0900)  Cognition: Follows commands;Decreased attention/concentration (04/19/18 0900)  Speech: Clear;Delayed responses (04/19/18 0900)  LUE Motor Response: Purposeful (04/19/18 0900)  LLE Motor Response: Purposeful (04/19/18 0900)  RUE Motor Response: Purposeful (04/19/18 0900)  RLE Motor Response: Purposeful (04/19/18 0900)   At baseline    Mental Status and Level of Consciousness: Arousable    Pulmonary Status:   O2 Device: Room air (04/19/18 1045)   Adequate oxygenation and airway patent    Complications related to anesthesia: None    Post-anesthesia assessment completed.  No concerns    Signed By: Neymar Tristan MD     April 19, 2018

## 2018-04-19 NOTE — PROGRESS NOTES
Spiritual Care Partner Volunteer visited patient in [place] on 4/19/18. Documented by:  STEVEN Levin

## 2018-04-19 NOTE — DISCHARGE INSTRUCTIONS
Discharge Instructions:  Right Thyroid Lobectomy    Dr. Lili Bernal    Call for appointment for follow up in 1 week [phone]    Activity:    Walk regularly. You may resume driving in 24 hours unless still requiring narcotics for pain. Work:    You may return to work in 11 - 7 days to light activity. No lifting more than 10 pounds for one week. Diet:    You may resume normal diet after 24 hours. Anesthesia and narcotics may cause nausea and vomiting. If persistent please call the office. Call if you have numbness or tingling around lips or fingertips as this is a sign of low calcium. Wound Care: You have a special dressing called Dermabond. It is okay to shower and let the water run over the incision but do not scrub the area or soak in a tub. If you have a small amount of drainage you may place a dry bandage over the wound and change it daily. If you experience a lot of drainage, develop redness around the wound, or a fever over 101 F occurs please call the office. Medications:    Resume home medications as indicated on the Medical Reconciliation form. Aspirin, Coumadin, and Plavix can be restarted on post operative day 2 if you were taking them preoperatively. Pain medications:  Non steroidal antiinflammatories seem to work best for post surgical pain. Try these first as prescribed. A narcotic prescription will also be given for breakthrough pain. Over the counter stool softeners and laxatives may be used if needed. Do not hesitate to call with questions or concerns.

## 2018-04-19 NOTE — H&P
HISTORY OF PRESENT ILLNESS  Apollo Guerrier is a 59 y.o. male who comes in for consultation by Dr Winsome Guillen for a thyroid cyst  HPI  He developed right anterior neck swelling rather acutely in Oct 2017. He had a neck US demonstrated a 5 cm cyst on the right lobe of the thyroid. Thyroid function tests at that time noted a high TSH. It was not symptomatic initially and he initially elected observation. It has continued to enlarge and he is now having dysphagia. He denies voice changes, palpitations, or neck pressure/respiratory issues when laying flat. He denies family his of thyroid disease.             Past Medical History:   Diagnosis Date    Cancer [facility]) 2006     skin            Past Surgical History:   Procedure Laterality Date    HX OTHER SURGICAL   1998     hydrocele repair       Family History   Problem Relation Age of Onset    Heart Disease Mother      Alcohol abuse Father      Arthritis-osteo Neg Hx      Asthma Neg Hx      Bleeding Prob Neg Hx      Cancer Neg Hx      Diabetes Neg Hx      Elevated Lipids Neg Hx      Gall Bladder Disease Neg Hx      Headache Neg Hx      Hypertension Neg Hx      Lung Disease Neg Hx      Mental Retardation Neg Hx      Migraines Neg Hx      Psychiatric Disorder Neg Hx      Stroke Neg Hx             Social History   Substance Use Topics    Smoking status: Former Smoker    Smokeless tobacco: Never Used    Alcohol use Yes      No current outpatient prescriptions on file.      No current facility-administered medications for this visit.            Allergies   Allergen Reactions    Sulfa (Sulfonamide Antibiotics) Rash         Review of Systems   Constitutional: Negative for chills, diaphoresis, fever, malaise/fatigue and weight loss. HENT: Negative for congestion, ear pain and sore throat. Eyes: Negative for blurred vision and pain. Respiratory: Negative for cough, hemoptysis, sputum production, shortness of breath, wheezing and stridor. Cardiovascular: Negative for chest pain, palpitations, orthopnea, claudication, leg swelling and PND. Gastrointestinal: Negative for abdominal pain, blood in stool, constipation, diarrhea, heartburn, melena, nausea and vomiting. Genitourinary: Negative for dysuria, flank pain, frequency, hematuria and urgency. Musculoskeletal: Negative for back pain, joint pain, myalgias and neck pain. Skin: Negative for itching and rash. Neurological: Negative for dizziness, tremors, focal weakness, seizures, weakness and headaches. Endo/Heme/Allergies: Negative for polydipsia. Psychiatric/Behavioral: Negative for depression and memory loss. The patient is not nervous/anxious.            Visit Vitals    BP (!) 153/103 (BP 1 Location: Right arm, BP Patient Position: Sitting)    Pulse 73    Ht 6' 1\" (1.854 m)    Wt 90.7 kg (200 lb)    SpO2 96%    BMI 26.39 kg/m2         Physical Exam   Constitutional: He is oriented to person, place, and time. He appears well-developed and well-nourished. No distress. HENT:   Head: Normocephalic and atraumatic. Mouth/Throat: Oropharynx is clear and moist. No oropharyngeal exudate. Eyes: Conjunctivae and EOM are normal. Pupils are equal, round, and reactive to light. No scleral icterus. Neck: Trachea normal, normal range of motion, full passive range of motion without pain and phonation normal. Neck supple. No tracheal deviation present. Thyroid mass (7 cm on right) present. No thyromegaly present. Cardiovascular: Normal rate, regular rhythm and normal heart sounds. Exam reveals no gallop and no friction rub. No murmur heard. Pulmonary/Chest: Effort normal and breath sounds normal. No stridor. No respiratory distress. He has no wheezes. He has no rales. Abdominal: Soft. Normal appearance and bowel sounds are normal. He exhibits no distension, no pulsatile liver and no mass. There is no hepatosplenomegaly. There is no tenderness.  There is no rebound, no guarding and no CVA tenderness. No hernia. Hernia confirmed negative in the right inguinal area and confirmed negative in the left inguinal area. Genitourinary: Testes normal. Cremasteric reflex is present. Circumcised. Musculoskeletal: Normal range of motion. He exhibits no edema or tenderness. Lymphadenopathy:     He has no cervical adenopathy. Right: No inguinal adenopathy present. Left: No inguinal adenopathy present. Neurological: He is alert and oriented to person, place, and time. No cranial nerve deficit. Coordination normal.   Skin: Skin is warm and dry. No rash noted. He is not diaphoretic. No erythema. Psychiatric: He has a normal mood and affect. His behavior is normal. Judgment and thought content normal.         ASSESSMENT and PLAN  1. Right thyroid cystic nodule. This is likely benign. I explained about the anatomy and pathophysiology of thyroid nodules/disease. I explained about possible malignancy. I discussed FNA, observation, possible thyroid suppression pending FNA, and total thyroidectomy. Risks of surgery include bleeding (potentially requiring emergent exploration at bedside), infection, parathyroid injury/removal requiring supplemental calcium +/- vit d, recurrent laryngeal/superior laryngeal nerve injury resulting in vocal cord paralysis, voice changes and fatigue, aspiration, further surgery, need for lifelong thyroid supplementation. This is likely a benign process.   We discussed likelihood of fluid reaccumulation in the cyst with aspiration.     He wishes to proceed with a  Right thyroid lobectomy under general anesthesia as an outpatient with an overnight stay     Tan Chew MD FACS

## 2018-04-19 NOTE — OP NOTES
[facility]  OPERATIVE REPORT    Latisha Gregory  MR#: [id number]  : 1960  ACCOUNT #: [de-identified]   DATE OF SERVICE: 2018    PREOPERATIVE DIAGNOSIS:  Large right thyroid nodule. POSTOPERATIVE DIAGNOSIS:  Large right thyroid nodule. PROCEDURE PERFORMED:  Right thyroid lobectomy. SURGEON:  Yifan Rodríguez. Saba Holliday MD    ASSISTANTS:  Jessica Kunz MD    ANESTHESIA:  General.    ESTIMATED BLOOD LOSS:  10 mL. COMPLICATIONS:  None. SPECIMENS REMOVED:  Right lobe of thyroid, stitch to the right upper pole. COMPLICATIONS:  None. FINDINGS:  Large right thyroid nodule. IMPLANTS:  None. BRIEF HISTORY:  The patient is a 70-year-old gentleman who has an enlarging right thyroid cystic nodule. Options were discussed for observation versus excision. He wished to have it excised and now presents for that. He understands the risks and benefits and wishes to proceed. PROCEDURE:  The patient was taken to the operating room and placed on the operating table in supine position, underwent general anesthesia. A roll was placed underneath the shoulders and the neck was placed in mild hyperextension, and the neck was prepped and draped in the usual sterile fashion. After appropriate timeout and antibiotics were given, a curvilinear incision was made in one of Alek's lines in the lower neck and electrocautery was used to go through the subcutaneous tissues and through the platysma, and subplatysmal flaps were developed superiorly to the thyroid cartilage, inferior to the sternal notch. Then the investing fascia overlying the strap muscles was incised longitudinally, allowing the strap muscle to fall out laterally. We worked on the right side first, a very large nodule and dissected up the upper pole and lower pole vessels and fine silk ties and Ligaclips placed in lymphovascular channels, and then mobilized from a posterolateral to medial approach.   Parathyroids were identified and teased off of the thyroid and spared, and the recurrent laryngeal nerve was identified in its path. We took it up off of the ligament of Berry and then with electrocautery, divided the thyroid at the thyroid isthmus. We labeled the specimen with a stitch at the right upper pole and then no evidence of ongoing bleeding. An interrupted 2-0 Vicryl was used to approximate the strap muscles, interrupted 3-0 Vicryl was used to approximate the platysma, and a running 4-0 Vicryl was used to close the skin, and a Dermabond dressing applied. Upon completion of the operation, the needle, sponge and instrument counts were correct x2. The patient tolerated the procedure well and was extubated and brought to the recovery room. MD Hermelinda Mack  D: 04/19/2018 09:06     T: 04/19/2018 11:51  JOB #: [id number]  CC: Darya Sumner MD

## 2018-04-19 NOTE — ANESTHESIA PREPROCEDURE EVALUATION
Anesthetic History   No history of anesthetic complications            Review of Systems / Medical History  Patient summary reviewed, nursing notes reviewed and pertinent labs reviewed    Pulmonary  Within defined limits                 Neuro/Psych   Within defined limits           Cardiovascular    Hypertension: well controlled              Exercise tolerance: >4 METS     GI/Hepatic/Renal  Within defined limits              Endo/Other  Within defined limits           Other Findings   Comments: Large right thyroid nodule           Physical Exam    Airway  Mallampati: II  TM Distance: > 6 cm  Neck ROM: normal range of motion   Mouth opening: Normal    Comments: Large right thyroid nodule Cardiovascular  Regular rate and rhythm,  S1 and S2 normal,  no murmur, click, rub, or gallop             Dental  No notable dental hx       Pulmonary  Breath sounds clear to auscultation               Abdominal  GI exam deferred       Other Findings            Anesthetic Plan    ASA: 2  Anesthesia type: general          Induction: Intravenous  Anesthetic plan and risks discussed with: Patient

## 2018-04-19 NOTE — PERIOP NOTES
Patient: Don Metzger MRN: [id number]  SSN: [id number]   YOB: 1960  Age: 59 y.o. Sex: male     Patient is status post Procedure(s):  RIGHT THYROID LOBECTOMY. Surgeon(s) and Role:     * Sandeep Cardenas MD - Primary    Local/Dose/Irrigation:  SEE MAR                  Peripheral IV 04/19/18 Left Hand (Active)   Site Assessment Clean, dry, & intact 4/19/2018  6:17 AM   Phlebitis Assessment 0 4/19/2018  6:17 AM   Infiltration Assessment 0 4/19/2018  6:17 AM   Dressing Status Clean, dry, & intact 4/19/2018  6:17 AM   Dressing Type Tape;Transparent 4/19/2018  6:17 AM   Hub Color/Line Status Pink; Infusing 4/19/2018  6:17 AM            Airway - Endotracheal Tube 04/19/18 Oral (Active)   Line Vadim Lips 4/19/2018 12:00 AM                   Dressing/Packing:  Wound Neck Anterior;Mid-DRESSING TYPE: Topical skin adhesive/glue (04/19/18 0845)  Splint/Cast:  ]    Other:  None

## 2018-04-19 NOTE — PERIOP NOTES
Family updated at 34 by Berta Jasso. Information provided to the patient's wife, Benita Pradhan. Mrs. Aidee Mendoza expressed frustration to this nurse that she was unable to see her  and that there was no bed ready for him. Mrs. 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 This nurse explained that to protect the privacy of the other patient's being cared for in the PACU, family visitation is prohibited. The patient's wife verbalized understanding of HIPPA policies and continued to express frustration regarding the conditions of the surgical waiting area, including the noise level, the amount of people in the waiting room, and the uncomfortable accommodations. This nurse verbalized understanding of Mrs. Regis Burkitt frustration and assured her that Mr. Aidee Mendoza is doing well, with minimal complaints of discomfort and stable vital signs. Mrs. Aidee Mendoza was informed that she would be given regular updates until a room is assigned. Mrs. Aidee Mendoza requested that her mobile phone was to be used for updates instead of calling out to the surgical waiting area.   This nurse verbalized understanding of Mrs. Regis Burkitt request.

## 2018-04-19 NOTE — PROGRESS NOTES
Admit Date: 2018    POD Day of Surgery    Procedure:  Procedure(s):  RIGHT THYROID LOBECTOMY      Assessment:   Active Problems:    Right thyroid nodule (2018)      1. Feels ok  2. Mild dysphonia  3. Calcium ok      Plan/Recommendations/Medical Decision Makin.  discharge    Subjective:     Patient has no complaints. Objective:     Blood pressure 120/81, pulse 88, temperature 97.9 °F (36.6 °C), resp. rate 12, height 6' 1\" (1.854 m), weight 88.3 kg (194 lb 10.7 oz), SpO2 100 %. Temp (24hrs), Av °F (36.7 °C), Min:97.9 °F (36.6 °C), Max:98.1 °F (36.7 °C)      Physical Exam:  THROAT & NECK: normal, no erythema or exudates noted.   and neck supple and symmetrical.  incision CDI    Labs:   Recent Results (from the past 48 hour(s))   CALCIUM    Collection Time: 18  2:08 PM   Result Value Ref Range    Calcium 8.9 8.5 - 10.1 MG/DL       Data Review images and reports reviewed

## 2018-04-19 NOTE — BRIEF OP NOTE
BRIEF OPERATIVE NOTE    Date of Procedure: 4/19/2018   Preoperative Diagnosis: RIGHT THYROID NODULE  Postoperative Diagnosis: RIGHT THYROID NODULE    Procedure(s):  RIGHT THYROID LOBECTOMY  Surgeon(s) and Role:     * Tam Bryan MD - Primary         Surgical Assistant: Rob Cheney    Surgical Staff:  Circ-1: Burlene Gilford  Circ-2: Raquel Mejai RN  Scrub Tech-1: Daniela Grace [facility]  Surg Asst-1: Tamar Rizo  Event Time In   Incision Start 5830   Incision Close 0846     Anesthesia: General   Estimated Blood Loss: 10 ml  Specimens:   ID Type Source Tests Collected by Time Destination   1 : Right Lobe of Thyroid Preservative Thyroid  Tam Bryan MD 4/19/2018 0802 Pathology      Findings: large right thyroid nodule   Complications: none  Implants: * No implants in log *

## 2018-04-19 NOTE — PERIOP NOTES
Family updated at 63 by Renetta Lilly. The patient's wife was informed that the patient has a room assigned to him and he will be transferred as soon as the room is ready. Mrs. Gal Botello was also informed that the patient has received 2 doses of pain medication and is currently resting comfortably. This nurse assured Mrs. Kang that she will be notified when the patient is transferred upstairs.

## 2018-04-19 NOTE — PERIOP NOTES
Handoff Report from Operating Room to PACU    Report received from STEVEN Valdez RN and Natalie Cook CRNA regarding Gustavo Amis. Surgeon(s):  Renetta Briones MD  And Procedure(s) (LRB):  RIGHT THYROID LOBECTOMY (Right)  confirmed   with allergies and dressings discussed. Anesthesia type, drugs, patient history, complications, estimated blood loss, vital signs, intake and output, and last pain medication, lines, reversal medications and temperature were reviewed.

## 2018-04-19 NOTE — IP AVS SNAPSHOT
[address] [place] [address] 
[phone] Patient: Gilmer Cruz MRN: [id number] TKS:9/47/9720 About your hospitalization You were admitted on:  April 19, 2018 You last received care in the:  [facility] 2 GENERAL SURGERY You were discharged on:  April 19, 2018 Why you were hospitalized Your primary diagnosis was:  Not on File Your diagnoses also included:  Right Thyroid Nodule Follow-up Information Follow up With Details Comments Contact Info Jasbir Park NP   [address] [place] [address] 
[phone] Your Scheduled Appointments Friday April 27, 2018  1:30 PM EDT  
POST OP with Kyree Gavin MD  
[facility] Dr. Tamir Lopez [facility] ([place] [address], [address] [facility]  
[phone] Discharge Orders None A check jaquan indicates which time of day the medication should be taken. My Medications START taking these medications Instructions Each Dose to Equal  
 Morning Noon Evening Bedtime  
 oxyCODONE-acetaminophen 5-325 mg per tablet Commonly known as:  PERCOCET Your last dose was: Your next dose is: Take 1-2 Tabs by mouth every six (6) hours as needed for Pain for up to 20 days. Max Daily Amount: 8 Tabs. 1-2 Tab CONTINUE taking these medications Instructions Each Dose to Equal  
 Morning Noon Evening Bedtime  
 amLODIPine 5 mg tablet Commonly known as:  Ulises Albert Your last dose was: Your next dose is: Take 5 mg by mouth daily. 5 mg Where to Get Your Medications Information on where to get these meds will be given to you by the nurse or doctor. ! Ask your nurse or doctor about these medications  
  oxyCODONE-acetaminophen 5-325 mg per tablet Opioid Education Prescription Opioids: What You Need to Know: 
 
 
Walk regularly. You may resume driving in 24 hours unless still requiring narcotics for pain. Work: 
 
You may return to work in 11 - 7 days to light activity. No lifting more than 10 pounds for one week. Diet: 
 
You may resume normal diet after 24 hours. Anesthesia and narcotics may cause nausea and vomiting. If persistent please call the office. Call if you have numbness or tingling around lips or fingertips as this is a sign of low calcium. Wound Care: You have a special dressing called Dermabond. It is okay to shower and let the water run over the incision but do not scrub the area or soak in a tub. If you have a small amount of drainage you may place a dry bandage over the wound and change it daily. If you experience a lot of drainage, develop redness around the wound, or a fever over 101 F occurs please call the office. Medications: 
 
Resume home medications as indicated on the Medical Reconciliation form. Aspirin, Coumadin, and Plavix can be restarted on post operative day 2 if you were taking them preoperatively. Pain medications:  Non steroidal antiinflammatories seem to work best for post surgical pain. Try these first as prescribed. A narcotic prescription will also be given for breakthrough pain. Over the counter stool softeners and laxatives may be used if needed. Do not hesitate to call with questions or concerns. Introducing [facility] & HEALTH SERVICES!    
 Mary Nevarez introduces Think Sky patient portal. Now you can access parts of your medical record, email your doctor's office, and request medication refills online. 1. In your internet browser, go to [email or web address] 2. Click on the First Time User? Click Here link in the Sign In box. You will see the New Member Sign Up page. 3. Enter your Wazzle Entertainmentt Access Code exactly as it appears below. You will not need to use this code after youve completed the sign-up process. If you do not sign up before the expiration date, you must request a new code. · DewMobile Access Code: [email or web address] Expires: 6/10/2018  8:09 AM 
 
4. Enter the last four digits of your Social Security Number (xxxx) and Date of Birth (mm/dd/yyyy) as indicated and click Submit. You will be taken to the next sign-up page. 5. Create a DewMobile ID. This will be your DewMobile login ID and cannot be changed, so think of one that is secure and easy to remember. 6. Create a DewMobile password. You can change your password at any time. 7. Enter your Password Reset Question and Answer. This can be used at a later time if you forget your password. 8. Enter your e-mail address. You will receive e-mail notification when new information is available in [address] 9. Click Sign Up. You can now view and download portions of your medical record. 10. Click the Download Summary menu link to download a portable copy of your medical information. If you have questions, please visit the Frequently Asked Questions section of the DewMobile website. Remember, DewMobile is NOT to be used for urgent needs. For medical emergencies, dial 911. Now available from your iPhone and Android! Introducing Jesus Pham As a [facility] patient, I wanted to make you aware of our electronic visit tool called Jesus Pham. [facility] 24/7 allows you to connect within minutes with a medical provider 24 hours a day, seven days a week via a mobile device or tablet or logging into a secure website from your computer. You can access Mobile Sorcery from anywhere in the [place]. A virtual visit might be right for you when you have a simple condition and feel like you just dont want to get out of bed, or cant get away from work for an appointment, when your regular [facility] provider is not available (evenings, weekends or holidays), or when youre out of town and need minor care. Electronic visits cost only $49 and if the SrViratech 24/[employer] provider determines a prescription is needed to treat your condition, one can be electronically transmitted to a nearby pharmacy*. Please take a moment to enroll today if you have not already done so. The enrollment process is free and takes just a few minutes. To enroll, please download the Space Star Technology za to your tablet or phone, or visit [email or web address] to enroll on your computer. And, as an [address] [place] [address] patient with a servtag account, the results of your visits will be scanned into your electronic medical record and your primary care provider will be able to view the scanned results. We urge you to continue to see your regular [facility] provider for your ongoing medical care. And while your primary care provider may not be the one available when you seek a Jesus Mayfin virtual visit, the peace of mind you get from getting a real diagnosis real time can be priceless. For more information on RECEPTA biopharmaprietofin, view our Frequently Asked Questions (FAQs) at [email or web address]. Sincerely, 
 
Miles Abarca MD 
Chief Medical Officer [employer] *:  certain medications cannot be prescribed via RECEPTA biopharmabrice Providers Seen During Your Hospitalization Provider Specialty Primary office phone Lita Boxer, MD General Surgery [phone] Your Primary Care Physician (PCP) Primary Care Physician Office Phone Office Fax Blas Sj [phone] You are allergic to the following Allergen Reactions Sulfa (Sulfonamide Antibiotics) Rash Recent Documentation Height Weight BMI Smoking Status 1.854 m 88.3 kg 25.68 kg/m2 Former Smoker Emergency Contacts Name Discharge Info Relation Home Work Mobile [address] CAREGIVER [3] Spouse [3]   [phone] Patient Belongings The following personal items are in your possession at time of discharge: 
  Dental Appliances: None  Visual Aid: None      Home Medications: None   Jewelry: None  Clothing: Undergarments, Pants, Shirt, Footwear, Socks    Other Valuables: None  Personal Items Sent to Safe: declined Please provide this summary of care documentation to your next provider. Signatures-by signing, you are acknowledging that this After Visit Summary has been reviewed with you and you have received a copy. Patient Signature:  ____________________________________________________________ Date:  ____________________________________________________________  
  
Jacky Beyer Provider Signature:  ____________________________________________________________ Date:  ____________________________________________________________

## 2018-04-19 NOTE — PERIOP NOTES
TRANSFER - OUT REPORT:    Verbal report given to [facility], RN on Michael Palomares  being transferred to General Surgery Unit for routine post - op. Report consisted of patients Situation, Background, Assessment and   Recommendations(SBAR). Information from the following report(s) SBAR, Kardex, Procedure Summary, Intake/Output, MAR, Accordion, Recent Results and Cardiac Rhythm sinus rhythm was reviewed with the receiving nurse. Opportunity for questions and clarification was provided.       Patient transported with:   [employer]   Patient chart and belongings

## 2018-04-27 ENCOUNTER — OFFICE VISIT (OUTPATIENT)
Dept: SURGERY | Age: 58
End: 2018-04-27

## 2018-04-27 VITALS
WEIGHT: 194 LBS | DIASTOLIC BLOOD PRESSURE: 92 MMHG | OXYGEN SATURATION: 98 % | HEART RATE: 83 BPM | HEIGHT: 73 IN | SYSTOLIC BLOOD PRESSURE: 137 MMHG | BODY MASS INDEX: 25.71 KG/M2

## 2018-04-27 DIAGNOSIS — E07.9 THYROID DISEASE: Primary | ICD-10-CM

## 2018-04-27 NOTE — PROGRESS NOTES
Surgery  Follow up  Procedure: right thyroid lobectomy  OR date:  4/19/2018  Path:       Right thyroid lobe, lobectomy:   Dominant benign hemorrhagic cyst (4.5 cm) in a multinodular thyroid.      S I feel fine, no pain, paresthesias, voice changes    Visit Vitals    BP (!) 137/92 (BP 1 Location: Right arm, BP Patient Position: Sitting)    Pulse 83    Ht 6' 1\" (1.854 m)    Wt 88 kg (194 lb)    SpO2 98%    BMI 25.6 kg/m2       O Incisions healing well without infection   Mild swelling       A/P Doing well   Check TSH and Free T4 in 6 weeks   Will call with results as he likely will not come back for visit (lives in [address] [place] [address])   RTC 6 week or demarco Neely MD FACS

## 2018-04-27 NOTE — MR AVS SNAPSHOT
[address], [address] 
[phone] Patient: Josef Cooley MRN: [id number] JNW:0/32/9696 Visit Information Date & Time Provider Department Dept. Phone Encounter #  
 4/27/2018  1:30 PM Abdullahi Joe MD [facility] [id number] Upcoming Health Maintenance Date Due Hepatitis C Screening 1960 DTaP/Tdap/Td series (1 - Tdap) 2/14/1979 FOBT Q 1 YEAR AGE 50-75 2/14/2008 ZOSTER VACCINE AGE 60> 12/14/2017 Influenza Age 5 to Adult 8/1/2018 Allergies as of 4/27/2018  Review Complete On: 4/27/2018 By: Abdullahi Joe MD  
  
 Severity Noted Reaction Type Reactions Sulfa (Sulfonamide Antibiotics)  11/07/2017    Rash Current Immunizations  Never Reviewed No immunizations on file. Not reviewed this visit You Were Diagnosed With   
  
 Codes Comments Thyroid disease    -  Primary ICD-10-CM: E07.9 ICD-9-CM: 246. 9 Vitals BP Pulse Height(growth percentile) Weight(growth percentile) SpO2 BMI  
 (!) 137/92 (BP 1 Location: Right arm, BP Patient Position: Sitting) 83 6' 1\" (1.854 m) 194 lb (88 kg) 98% 25.6 kg/m2 Smoking Status Former Smoker BMI and BSA Data Body Mass Index Body Surface Area  
 25.6 kg/m 2 2.13 m 2 Preferred Pharmacy Pharmacy Name Phone [id number], [facility] [phone] Your Updated Medication List  
  
   
This list is accurate as of 4/27/18  1:43 PM.  Always use your most recent med list. amLODIPine 5 mg tablet Commonly known as:  [place] Take 5 mg by mouth daily. oxyCODONE-acetaminophen 5-325 mg per tablet Commonly known as:  PERCOCET Take 1-2 Tabs by mouth every six (6) hours as needed for Pain for up to 20 days. Max Daily Amount: 8 Tabs. We Performed the Following T4, FREE [id number] CPT(R)] TSH 3RD GENERATION [66817 CPT(R)] Introducing [facility] & HEALTH SERVICES! Mingo Farley introduces ActiViews patient portal. Now you can access parts of your medical record, email your doctor's office, and request medication refills online. 1. In your internet browser, go to [email or web address] Home 2. Click on the First Time User? Click Here link in the Sign In box. You will see the New Member Sign Up page. 3. Enter your ActiViews Access Code exactly as it appears below. You will not need to use this code after youve completed the sign-up process. If you do not sign up before the expiration date, you must request a new code. · ActiViews Access Code: [email or web address] Expires: 6/10/2018  8:09 AM 
 
4. Enter the last four digits of your Social Security Number (xxxx) and Date of Birth (mm/dd/yyyy) as indicated and click Submit. You will be taken to the next sign-up page. 5. Create a ActiViews ID. This will be your ActiViews login ID and cannot be changed, so think of one that is secure and easy to remember. 6. Create a ActiViews password. You can change your password at any time. 7. Enter your Password Reset Question and Answer. This can be used at a later time if you forget your password. 8. Enter your e-mail address. You will receive e-mail notification when new information is available in [address] 9. Click Sign Up. You can now view and download portions of your medical record. 10. Click the Download Summary menu link to download a portable copy of your medical information. If you have questions, please visit the Frequently Asked Questions section of the ActiViews website. Remember, ActiViews is NOT to be used for urgent needs. For medical emergencies, dial 911. Now available from your iPhone and Android! Please provide this summary of care documentation to your next provider. Your primary care clinician is listed as Maximus Stubbs. If you have any questions after today's visit, please call [phone].

## 2018-04-27 NOTE — PROGRESS NOTES
1. Have you been to the ER, urgent care clinic since your last visit? Hospitalized since your last visit? No    2. Have you seen or consulted any other health care providers outside of the [address] [place] [address] since your last visit? Include any pap smears or colon screening.  No

## 2018-06-13 LAB
T4 FREE SERPL-MCNC: 0.98 NG/DL (ref 0.82–1.77)
TSH SERPL DL<=0.005 MIU/L-ACNC: 15.93 UIU/ML (ref 0.45–4.5)

## 2018-06-18 ENCOUNTER — TELEPHONE (OUTPATIENT)
Dept: SURGERY | Age: 58
End: 2018-06-18

## 2018-06-18 DIAGNOSIS — E03.4 HYPOTHYROIDISM DUE TO ACQUIRED ATROPHY OF THYROID: Primary | ICD-10-CM

## 2018-06-18 RX ORDER — LEVOTHYROXINE SODIUM 50 UG/1
50 TABLET ORAL
Qty: 30 TAB | Refills: 3 | Status: SHIPPED | OUTPATIENT
Start: 2018-06-18

## 2018-06-18 NOTE — TELEPHONE ENCOUNTER
6/12/2018  FT4 0.98  TSH 15.930    Left message  He should be placed on synthroid      Sathya Griffin MD FACS

## 2018-06-18 NOTE — TELEPHONE ENCOUNTER
Will send rx for synthroid 50 mcg daily to his rx in [place] on our list    Check TSH in 4 months    Meg Killian MD FACS

## 2024-01-30 NOTE — MR AVS SNAPSHOT
[address], [address] [facility], [address] [place] [address] [facility] 
[phone] Patient: Yoil Horan MRN: [id number] HWQ:9/33/2056 Visit Information Date & Time Provider Department Dept. Phone Encounter #  
 3/12/2018  8:20 AM Tia Sanchez MD [facility] [id number] Your Appointments 4/27/2018  9:20 AM  
POST OP with Tia Sanchez MD  
[facility] Dr. Tamir Lopez [facility] ([address] [facility]) Appt Note: post op Rt thyroid lobectomy 04/19/2018  
 [address] [facility], [address] [facility], [address] [phone]  
[address], [address] [facility], [address] [place] [address] [address] [address]  
  
    
 4/30/2018  9:30 AM  
Follow Up with Kimberly Valiente MD  
[facility] [address] [place] [address]) Appt Note: 5 month f/u  
 [address] [facility] [address] [address] [address] [facility] Upcoming Health Maintenance Date Due Hepatitis C Screening 1960 DTaP/Tdap/Td series (1 - Tdap) 2/14/1979 FOBT Q 1 YEAR AGE 50-75 2/14/2008 Influenza Age 5 to Adult 8/1/2017 ZOSTER VACCINE AGE 60> 12/14/2017 Allergies as of 3/12/2018  Review Complete On: 3/12/2018 By: Tia Sanchez MD  
  
 Severity Noted Reaction Type Reactions Sulfa (Sulfonamide Antibiotics)  11/07/2017    Rash Current Immunizations  Never Reviewed No immunizations on file. Not reviewed this visit Vitals BP Pulse Height(growth percentile) Weight(growth percentile) SpO2 BMI  
 (!) 153/103 (BP 1 Location: Right arm, BP Patient Position: Sitting) 73 6' 1\" (1.854 m) 200 lb (90.7 kg) 96% 26.39 kg/m2 Smoking Status Former Smoker Vitals History BMI and BSA Data  Body Mass Index Body Surface Area  
 26.39 kg/m 2 2.16 m 2  
  
     Glucose (mg/dL)   Date Value   12/29/2023 87   06/02/2023 94   07/06/2022 96     Calcium (mg/dL)   Date Value   12/29/2023 9.0   06/02/2023 9.7   07/06/2022 9.5       Cholesterol, Total (mg/dL)   Date Value   06/02/2023 212 (H)   07/06/2022 212 (H)   03/25/2021 204 (H)     Triglycerides (mg/dL)   Date Value   06/02/2023 96   07/06/2022 148   03/25/2021 77     HDL (mg/dL)   Date Value   06/02/2023 59   07/06/2022 52   03/25/2021 51     LDL Calculated (mg/dL)   Date Value   06/02/2023 134 (H)   07/06/2022 130 (H)   03/25/2021 138 (H)       ALT (U/L)   Date Value   06/02/2023 9 (L)   07/06/2022 9 (L)   03/25/2021 6 (L)     AST (U/L)   Date Value   06/02/2023 15   07/06/2022 15   03/25/2021 15       TSH (uIU/mL)   Date Value   06/02/2023 1.27   07/06/2022 2.33   03/25/2021 1.77     T4 Free (ng/dl)   Date Value   03/08/2012 0.99       WBC (K/uL)   Date Value   02/09/2018 11.7 (H)   11/21/2016 13.5 (H)   11/25/2015 11.3 (H)     Hemoglobin (g/dL)   Date Value   02/09/2018 14.5   11/21/2016 14.0   11/25/2015 13.9     Hematocrit (%)   Date Value   02/09/2018 43.4   11/21/2016 42.5   11/25/2015 43.2     MCV (fL)   Date Value   02/09/2018 91.4   11/21/2016 90.6   11/25/2015 93.5     Platelets (K/uL)   Date Value   02/09/2018 297   11/21/2016 273   11/25/2015 282       No results found for: \"PSA\"     No results found for: \"LABURIC\"     Vitals:    01/30/24 1327   BP: (!) 128/90   Pulse: 82   Resp: 16   SpO2: 98%       BP Readings from Last 3 Encounters:   01/30/24 (!) 128/90   12/29/23 (!) 142/88   12/01/23 126/84        Wt Readings from Last 3 Encounters:   01/30/24 67.6 kg (149 lb)   12/29/23 69.9 kg (154 lb)   12/01/23 70.3 kg (155 lb)        Review of Systems       Objective   Physical Exam        KINJAL BECKER MD   Preferred Pharmacy Pharmacy Name Phone [id number]. Luis Bejarano, [address] [facility] [phone] Your Updated Medication List  
  
Notice  As of 3/12/2018  9:03 AM  
 You have not been prescribed any medications. To-Do List   
 04/11/2018 8:00 AM  
  Appointment with ODALIS SCHAFER P3 at [address] ([phone])  
  
 04/20/2018 2:30 PM  
  Appointment with Eduardo Henson 1 at [facility] ([phone]) GENERAL INSTRUCTIONS  1. Bring outside films ([employer]) pertaining to the affected area with you on the day of appointment. 2. A written order with a valid diagnosis and Physicians signature is required for all scheduled tests. 3. Check in at registration 30 minutes before your appointment time unless you were instructed to do otherwise. Patient Instructions Surgery Instruction Sheet You have been scheduled for surgery on 04/19/2018 at 7:30am at [facility]. Please report to the Surgery Center at 5:45am, this is approximately 2 hours prior to your surgery time. The Surgery Center is located on the [address] [place] [address] Street side of the [facility], just next to the Emergency Room. Reserved parking is available and  parking if lot is full. You will need to have a Pre-op Visit prior to your surgery. Report to the Surgery Center on 04/11/2018 at 8:00am.  Bring a list of medications and your insurance cards with you. You may eat/drink prior to this visit. Call your physician immediately if you notice a change in your health between the time you saw your physician and the day of surgery. If you take a blood thinner, please let us know. Call your ordering Doctor to make sure you can stop taking it prior to your surgery. STOP YOUR ASPIRIN 10 DAYS PRIOR TO SURGERY.  
 
DO NOT TAKE  IBUPROFEN, ADVIL, MOTRIN, ALEVE, EXCEDRIN, BC POWDER, GOODIES, FISH OIL OR ANY MEDICATION CONTAINING ASPIRIN 10 DAYS PRIOR TO YOUR SURGERY. MAY TAKE TYLENOL. Eat a light dinner the evening before your surgery. DO NOT EAT OR DRINK ANYTHING AFTER MIDNIGHT THE NIGHT BEFORE YOUR SURGERY. This includes water, chewing gum, lifesavers, etc.  The Pre op nurse will check with you about any medication that you may need to take the morning of surgery. Shower with a new bar of anti-bacterial soap (Dial, Safeguard) or solution given to you by Pre-op, the night before surgery. Do not use lotion, powder, deodorant on the skin after showering. Wear loose, comfortable clothing the day of surgery and bring a container to store your contacts, eyeglasses, dentures, hearing aid, etc.  Do not bring money, valuables, jewelry, etc. to the hospital.   
 
If you are having outpatient surgery, someone must come with you the morning of surgery to drive you home. You can not drive for 24 hours after any anesthesia. Sometimes it is necessary to stay overnight and leave the next morning. This is still considered outpatient for most insurance deductibles. Someone will still need to drive you home. If you have questions or concerns, please feel free to call Dr Elvin Carreno at [phone]. If you need to cancel your surgery, please call as soon as possible. Introducing [facility] & HEALTH SERVICES! Krzysztof Chase introduces XunLight patient portal. Now you can access parts of your medical record, email your doctor's office, and request medication refills online. 1. In your internet browser, go to [email or web address]://Ar[employer]opharma. Seafarers CV/Tribal Novat 2. Click on the First Time User? Click Here link in the Sign In box. You will see the New Member Sign Up page. 3. Enter your XunLight Access Code exactly as it appears below. You will not need to use this code after youve completed the sign-up process. If you do not sign up before the expiration date, you must request a new code. · X-Scan Imaging Access Code: [email or web address] Expires: 6/10/2018  8:09 AM 
 
4. Enter the last four digits of your Social Security Number (xxxx) and Date of Birth (mm/dd/yyyy) as indicated and click Submit. You will be taken to the next sign-up page. 5. Create a X-Scan Imaging ID. This will be your [employer] login ID and cannot be changed, so think of one that is secure and easy to remember. 6. Create a X-Scan Imaging password. You can change your password at any time. 7. Enter your Password Reset Question and Answer. This can be used at a later time if you forget your password. 8. Enter your e-mail address. You will receive e-mail notification when new information is available in [address] 9. Click Sign Up. You can now view and download portions of your medical record. 10. Click the Download Summary menu link to download a portable copy of your medical information. If you have questions, please visit the Frequently Asked Questions section of the X-Scan Imaging website. Remember, X-Scan [employer] is NOT to be used for urgent needs. For medical emergencies, dial 911. Now available from your iPhone and Android! Please provide this summary of care documentation to your next provider. Your primary care clinician is listed as Kole Hernandez. If you have any questions after today's visit, please call [phone].

## (undated) DEVICE — DEVON™ KNEE AND BODY STRAP 60" X 3" (1.5 M X 7.6 CM): Brand: DEVON

## (undated) DEVICE — SOLUTION IV 1000ML 0.9% SOD CHL

## (undated) DEVICE — STERILE POLYISOPRENE POWDER-FREE SURGICAL GLOVES: Brand: PROTEXIS

## (undated) DEVICE — CLIP INT SM WIDE RED TI TRNSVRS GRV CHEVRON SHP W PRECIS

## (undated) DEVICE — MAGNETIC DRAPE: Brand: DEVON

## (undated) DEVICE — SUTURE VCRL SZ 3-0 L27IN ABSRB UD L26MM SH 1/2 CIR J416H

## (undated) DEVICE — PREP CHLORAPREP 10.5 ML ORG --

## (undated) DEVICE — SUTURE PERMAHAND SZ 3-0 L30IN NONABSORBABLE BLK SILK BRAID A304H

## (undated) DEVICE — TOWEL SURG W17XL27IN STD BLU COT NONFENESTRATED PREWASHED

## (undated) DEVICE — 1200 GUARD II KIT W/5MM TUBE W/O VAC TUBE: Brand: GUARDIAN

## (undated) DEVICE — SUTURE VCRL SZ 2-0 L27IN ABSRB VLT L26MM SH 1/2 CIR J317H

## (undated) DEVICE — KENDALL SCD EXPRESS SLEEVES, KNEE LENGTH, MEDIUM: Brand: KENDALL SCD

## (undated) DEVICE — X-RAY SPONGES,16 PLY: Brand: DERMACEA

## (undated) DEVICE — SURGICAL PROCEDURE PACK BASIN MAJ SET CUST NO CAUT

## (undated) DEVICE — INSULATED BLADE ELECTRODE: Brand: EDGE

## (undated) DEVICE — SHEET,T,THYROID,STERILE: Brand: MEDLINE

## (undated) DEVICE — BASIC PACK: Brand: CONVERTORS

## (undated) DEVICE — ROCKER SWITCH PENCIL BLADE ELECTRODE, HOLSTER: Brand: EDGE

## (undated) DEVICE — APPLICATOR BNDG 1MM ADH PREMIERPRO EXOFIN

## (undated) DEVICE — INFECTION CONTROL KIT SYS

## (undated) DEVICE — REM POLYHESIVE ADULT PATIENT RETURN ELECTRODE: Brand: VALLEYLAB

## (undated) DEVICE — DRAIN WND PENRS RADPQ 0.25X18 -- CONVERT TO ITEM 360112

## (undated) DEVICE — SPONGE HEMOSTAT CELLULS 4X8IN -- SURGICEL

## (undated) DEVICE — ZINACTIVE USE 2641837 CLIP LIG M BLU TI HRT SHP WIRE HORZ 600 PER BX

## (undated) DEVICE — GOWN,SIRUS,NONRNF,SETINSLV,2XL,18/CS: Brand: MEDLINE

## (undated) DEVICE — SUT SLK 2-0SH 30IN BLK --

## (undated) DEVICE — SUTURE VCRL SZ 4-0 L18IN ABSRB UD L13MM P-3 3/8 CIR PRIM J494H

## (undated) DEVICE — HANDLE LT SNAP ON ULT DURABLE LENS FOR TRUMPF ALC DISPOSABLE

## (undated) DEVICE — SUTURE PERMAHAND SZ 2-0 L30IN NONABSORBABLE BLK SILK W/O A305H

## (undated) DEVICE — SUTURE PERMAHAND SZ 4-0 L12X30IN NONABSORBABLE BLK SILK A303H